# Patient Record
Sex: MALE | Race: OTHER | NOT HISPANIC OR LATINO | URBAN - METROPOLITAN AREA
[De-identification: names, ages, dates, MRNs, and addresses within clinical notes are randomized per-mention and may not be internally consistent; named-entity substitution may affect disease eponyms.]

---

## 2018-07-22 ENCOUNTER — INPATIENT (INPATIENT)
Facility: HOSPITAL | Age: 58
LOS: 1 days | Discharge: LEFT BEFORE DISCHARGE | End: 2018-07-24
Attending: HOSPITALIST | Admitting: HOSPITALIST
Payer: COMMERCIAL

## 2018-07-22 VITALS
TEMPERATURE: 100 F | DIASTOLIC BLOOD PRESSURE: 78 MMHG | HEART RATE: 89 BPM | SYSTOLIC BLOOD PRESSURE: 139 MMHG | RESPIRATION RATE: 16 BRPM

## 2018-07-22 DIAGNOSIS — A41.9 SEPSIS, UNSPECIFIED ORGANISM: ICD-10-CM

## 2018-07-22 DIAGNOSIS — Z98.890 OTHER SPECIFIED POSTPROCEDURAL STATES: Chronic | ICD-10-CM

## 2018-07-22 DIAGNOSIS — E78.5 HYPERLIPIDEMIA, UNSPECIFIED: ICD-10-CM

## 2018-07-22 DIAGNOSIS — I10 ESSENTIAL (PRIMARY) HYPERTENSION: ICD-10-CM

## 2018-07-22 DIAGNOSIS — I44.7 LEFT BUNDLE-BRANCH BLOCK, UNSPECIFIED: ICD-10-CM

## 2018-07-22 DIAGNOSIS — R07.9 CHEST PAIN, UNSPECIFIED: ICD-10-CM

## 2018-07-22 DIAGNOSIS — N39.0 URINARY TRACT INFECTION, SITE NOT SPECIFIED: ICD-10-CM

## 2018-07-22 DIAGNOSIS — Z29.9 ENCOUNTER FOR PROPHYLACTIC MEASURES, UNSPECIFIED: ICD-10-CM

## 2018-07-22 LAB
ALBUMIN SERPL ELPH-MCNC: 4.7 G/DL — SIGNIFICANT CHANGE UP (ref 3.3–5)
ALP SERPL-CCNC: 84 U/L — SIGNIFICANT CHANGE UP (ref 40–120)
ALT FLD-CCNC: 27 U/L — SIGNIFICANT CHANGE UP (ref 4–41)
APPEARANCE UR: SIGNIFICANT CHANGE UP
APTT BLD: 28.5 SEC — SIGNIFICANT CHANGE UP (ref 27.5–37.4)
AST SERPL-CCNC: 19 U/L — SIGNIFICANT CHANGE UP (ref 4–40)
BACTERIA # UR AUTO: HIGH
BASE EXCESS BLDV CALC-SCNC: 3.2 MMOL/L — SIGNIFICANT CHANGE UP
BASOPHILS # BLD AUTO: 0.02 K/UL — SIGNIFICANT CHANGE UP (ref 0–0.2)
BASOPHILS NFR BLD AUTO: 0.1 % — SIGNIFICANT CHANGE UP (ref 0–2)
BILIRUB SERPL-MCNC: 0.6 MG/DL — SIGNIFICANT CHANGE UP (ref 0.2–1.2)
BILIRUB UR-MCNC: NEGATIVE — SIGNIFICANT CHANGE UP
BLOOD GAS VENOUS - CREATININE: 0.84 MG/DL — SIGNIFICANT CHANGE UP (ref 0.5–1.3)
BLOOD UR QL VISUAL: HIGH
BUN SERPL-MCNC: 18 MG/DL — SIGNIFICANT CHANGE UP (ref 7–23)
BUN SERPL-MCNC: 19 MG/DL — SIGNIFICANT CHANGE UP (ref 7–23)
CALCIUM SERPL-MCNC: 8.4 MG/DL — SIGNIFICANT CHANGE UP (ref 8.4–10.5)
CALCIUM SERPL-MCNC: 8.8 MG/DL — SIGNIFICANT CHANGE UP (ref 8.4–10.5)
CHLORIDE BLDV-SCNC: 105 MMOL/L — SIGNIFICANT CHANGE UP (ref 96–108)
CHLORIDE SERPL-SCNC: 100 MMOL/L — SIGNIFICANT CHANGE UP (ref 98–107)
CHLORIDE SERPL-SCNC: 100 MMOL/L — SIGNIFICANT CHANGE UP (ref 98–107)
CO2 SERPL-SCNC: 22 MMOL/L — SIGNIFICANT CHANGE UP (ref 22–31)
CO2 SERPL-SCNC: 25 MMOL/L — SIGNIFICANT CHANGE UP (ref 22–31)
COLOR SPEC: YELLOW — SIGNIFICANT CHANGE UP
CREAT SERPL-MCNC: 0.84 MG/DL — SIGNIFICANT CHANGE UP (ref 0.5–1.3)
CREAT SERPL-MCNC: 0.92 MG/DL — SIGNIFICANT CHANGE UP (ref 0.5–1.3)
EOSINOPHIL # BLD AUTO: 0.06 K/UL — SIGNIFICANT CHANGE UP (ref 0–0.5)
EOSINOPHIL NFR BLD AUTO: 0.4 % — SIGNIFICANT CHANGE UP (ref 0–6)
GAS PNL BLDV: 136 MMOL/L — SIGNIFICANT CHANGE UP (ref 136–146)
GLUCOSE BLDV-MCNC: 100 — HIGH (ref 70–99)
GLUCOSE SERPL-MCNC: 102 MG/DL — HIGH (ref 70–99)
GLUCOSE SERPL-MCNC: 99 MG/DL — SIGNIFICANT CHANGE UP (ref 70–99)
GLUCOSE UR-MCNC: NEGATIVE — SIGNIFICANT CHANGE UP
HCO3 BLDV-SCNC: 26 MMOL/L — SIGNIFICANT CHANGE UP (ref 20–27)
HCT VFR BLD CALC: 41.5 % — SIGNIFICANT CHANGE UP (ref 39–50)
HCT VFR BLDV CALC: 44.3 % — SIGNIFICANT CHANGE UP (ref 39–51)
HGB BLD-MCNC: 13.5 G/DL — SIGNIFICANT CHANGE UP (ref 13–17)
HGB BLDV-MCNC: 14.4 G/DL — SIGNIFICANT CHANGE UP (ref 13–17)
IMM GRANULOCYTES # BLD AUTO: 0.05 # — SIGNIFICANT CHANGE UP
IMM GRANULOCYTES NFR BLD AUTO: 0.3 % — SIGNIFICANT CHANGE UP (ref 0–1.5)
INR BLD: 1.22 — HIGH (ref 0.88–1.17)
KETONES UR-MCNC: SIGNIFICANT CHANGE UP
LACTATE BLDV-MCNC: 1.4 MMOL/L — SIGNIFICANT CHANGE UP (ref 0.5–2)
LEUKOCYTE ESTERASE UR-ACNC: HIGH
LYMPHOCYTES # BLD AUTO: 1.2 K/UL — SIGNIFICANT CHANGE UP (ref 1–3.3)
LYMPHOCYTES # BLD AUTO: 8 % — LOW (ref 13–44)
MCHC RBC-ENTMCNC: 29.2 PG — SIGNIFICANT CHANGE UP (ref 27–34)
MCHC RBC-ENTMCNC: 32.5 % — SIGNIFICANT CHANGE UP (ref 32–36)
MCV RBC AUTO: 89.8 FL — SIGNIFICANT CHANGE UP (ref 80–100)
MONOCYTES # BLD AUTO: 1.05 K/UL — HIGH (ref 0–0.9)
MONOCYTES NFR BLD AUTO: 7 % — SIGNIFICANT CHANGE UP (ref 2–14)
MUCOUS THREADS # UR AUTO: SIGNIFICANT CHANGE UP
NEUTROPHILS # BLD AUTO: 12.58 K/UL — HIGH (ref 1.8–7.4)
NEUTROPHILS NFR BLD AUTO: 84.2 % — HIGH (ref 43–77)
NITRITE UR-MCNC: POSITIVE — HIGH
NRBC # FLD: 0 — SIGNIFICANT CHANGE UP
PCO2 BLDV: 44 MMHG — SIGNIFICANT CHANGE UP (ref 41–51)
PH BLDV: 7.41 PH — SIGNIFICANT CHANGE UP (ref 7.32–7.43)
PH UR: 6 — SIGNIFICANT CHANGE UP (ref 4.6–8)
PLATELET # BLD AUTO: 219 K/UL — SIGNIFICANT CHANGE UP (ref 150–400)
PMV BLD: 9.5 FL — SIGNIFICANT CHANGE UP (ref 7–13)
PO2 BLDV: 35 MMHG — SIGNIFICANT CHANGE UP (ref 35–40)
POTASSIUM BLDV-SCNC: 3.4 MMOL/L — SIGNIFICANT CHANGE UP (ref 3.4–4.5)
POTASSIUM SERPL-MCNC: 3.5 MMOL/L — SIGNIFICANT CHANGE UP (ref 3.5–5.3)
POTASSIUM SERPL-MCNC: 3.6 MMOL/L — SIGNIFICANT CHANGE UP (ref 3.5–5.3)
POTASSIUM SERPL-SCNC: 3.5 MMOL/L — SIGNIFICANT CHANGE UP (ref 3.5–5.3)
POTASSIUM SERPL-SCNC: 3.6 MMOL/L — SIGNIFICANT CHANGE UP (ref 3.5–5.3)
PROT SERPL-MCNC: 7.8 G/DL — SIGNIFICANT CHANGE UP (ref 6–8.3)
PROT UR-MCNC: 100 MG/DL — SIGNIFICANT CHANGE UP
PROTHROM AB SERPL-ACNC: 13.6 SEC — HIGH (ref 9.8–13.1)
RBC # BLD: 4.62 M/UL — SIGNIFICANT CHANGE UP (ref 4.2–5.8)
RBC # FLD: 12.3 % — SIGNIFICANT CHANGE UP (ref 10.3–14.5)
RBC CASTS # UR COMP ASSIST: SIGNIFICANT CHANGE UP (ref 0–?)
SAO2 % BLDV: 65.5 % — SIGNIFICANT CHANGE UP (ref 60–85)
SODIUM SERPL-SCNC: 138 MMOL/L — SIGNIFICANT CHANGE UP (ref 135–145)
SODIUM SERPL-SCNC: 139 MMOL/L — SIGNIFICANT CHANGE UP (ref 135–145)
SP GR SPEC: 1.03 — SIGNIFICANT CHANGE UP (ref 1–1.04)
SQUAMOUS # UR AUTO: SIGNIFICANT CHANGE UP
TROPONIN T, HIGH SENSITIVITY: 9 NG/L — SIGNIFICANT CHANGE UP (ref ?–14)
TROPONIN T, HIGH SENSITIVITY: 9 NG/L — SIGNIFICANT CHANGE UP (ref ?–14)
UROBILINOGEN FLD QL: NORMAL MG/DL — SIGNIFICANT CHANGE UP
WBC # BLD: 14.96 K/UL — HIGH (ref 3.8–10.5)
WBC # FLD AUTO: 14.96 K/UL — HIGH (ref 3.8–10.5)
WBC UR QL: >50 — HIGH (ref 0–?)

## 2018-07-22 PROCEDURE — 12345: CPT

## 2018-07-22 PROCEDURE — 99223 1ST HOSP IP/OBS HIGH 75: CPT | Mod: GC

## 2018-07-22 PROCEDURE — 71045 X-RAY EXAM CHEST 1 VIEW: CPT | Mod: 26

## 2018-07-22 RX ORDER — CEFTRIAXONE 500 MG/1
1 INJECTION, POWDER, FOR SOLUTION INTRAMUSCULAR; INTRAVENOUS ONCE
Qty: 0 | Refills: 0 | Status: COMPLETED | OUTPATIENT
Start: 2018-07-22 | End: 2018-07-22

## 2018-07-22 RX ORDER — ACETAMINOPHEN 500 MG
975 TABLET ORAL ONCE
Qty: 0 | Refills: 0 | Status: COMPLETED | OUTPATIENT
Start: 2018-07-22 | End: 2018-07-22

## 2018-07-22 RX ORDER — ATORVASTATIN CALCIUM 80 MG/1
80 TABLET, FILM COATED ORAL AT BEDTIME
Qty: 0 | Refills: 0 | Status: DISCONTINUED | OUTPATIENT
Start: 2018-07-22 | End: 2018-07-24

## 2018-07-22 RX ORDER — SODIUM CHLORIDE 9 MG/ML
1000 INJECTION INTRAMUSCULAR; INTRAVENOUS; SUBCUTANEOUS
Qty: 0 | Refills: 0 | Status: DISCONTINUED | OUTPATIENT
Start: 2018-07-22 | End: 2018-07-24

## 2018-07-22 RX ORDER — VALSARTAN 80 MG/1
160 TABLET ORAL DAILY
Qty: 0 | Refills: 0 | Status: DISCONTINUED | OUTPATIENT
Start: 2018-07-22 | End: 2018-07-24

## 2018-07-22 RX ORDER — HEPARIN SODIUM 5000 [USP'U]/ML
5000 INJECTION INTRAVENOUS; SUBCUTANEOUS EVERY 8 HOURS
Qty: 0 | Refills: 0 | Status: DISCONTINUED | OUTPATIENT
Start: 2018-07-22 | End: 2018-07-24

## 2018-07-22 RX ORDER — ROSUVASTATIN CALCIUM 5 MG/1
1 TABLET ORAL
Qty: 0 | Refills: 0 | COMMUNITY

## 2018-07-22 RX ORDER — ACETAMINOPHEN 500 MG
650 TABLET ORAL EVERY 6 HOURS
Qty: 0 | Refills: 0 | Status: DISCONTINUED | OUTPATIENT
Start: 2018-07-22 | End: 2018-07-24

## 2018-07-22 RX ORDER — CEFTRIAXONE 500 MG/1
1 INJECTION, POWDER, FOR SOLUTION INTRAMUSCULAR; INTRAVENOUS EVERY 24 HOURS
Qty: 0 | Refills: 0 | Status: DISCONTINUED | OUTPATIENT
Start: 2018-07-23 | End: 2018-07-24

## 2018-07-22 RX ADMIN — ATORVASTATIN CALCIUM 80 MILLIGRAM(S): 80 TABLET, FILM COATED ORAL at 21:53

## 2018-07-22 RX ADMIN — Medication 975 MILLIGRAM(S): at 17:06

## 2018-07-22 RX ADMIN — CEFTRIAXONE 100 GRAM(S): 500 INJECTION, POWDER, FOR SOLUTION INTRAMUSCULAR; INTRAVENOUS at 17:10

## 2018-07-22 RX ADMIN — HEPARIN SODIUM 5000 UNIT(S): 5000 INJECTION INTRAVENOUS; SUBCUTANEOUS at 21:53

## 2018-07-22 RX ADMIN — Medication 975 MILLIGRAM(S): at 16:36

## 2018-07-22 NOTE — H&P ADULT - NSHPREVIEWOFSYSTEMS_GEN_ALL_CORE
REVIEW OF SYSTEMS:    CONSTITUTIONAL: No weakness, fatigue, malaise, fevers or chills, no weight change, appetite change  EYES: No visual changes; No double vision,  No vertigo, eye pain  Ears: no otalgia, no otorhea, no hearing loss, tinnitus  Nose: no epistaxis, rhinorrhea, post-discharge, sinus pressure  Throat: no throat pain, no oral lesions, tooth pain   NECK: No pain or stiffness  RESPIRATORY: No cough (productive or dry), wheezing, hemoptysis; No shortness of breath, orthnopnea, PND, SANDOVAL, snoring,  CARDIOVASCULAR: No chest pain or palpitations, no leg edema, no claudication    GASTROINTESTINAL: No abdominal or epigastric pain. No nausea, vomiting, or hematemesis; No diarrhea or constipation. No melena or hematochezia.  GENITOURINARY: No dysuria, frequency, urgency or hematuria, no pelvic pain, urinary incontinence, urgency  Muscloskeletal: no joints or muscle pain, no swelling in joints or muscles  NEUROLOGICAL: No numbness or weakness, headache, memory loss, seizures, dizziness, vertigo, syncope, ataxia  SKIN: No pruritis, rashes, lesions or new moles  Psych: No anxiety, sadness, insomnia, suicide thoughts  Endocrine: No Heat or Cold intolerance, polydipsia, polyphagia  Heme/Lymph: no LN enlargement, no easy bruising or bleeding REVIEW OF SYSTEMS:    CONSTITUTIONAL: No weakness, fatigue, malaise, +fevers, +chills, no weight change, no appetite change  RESPIRATORY: No cough (productive or dry), wheezing, hemoptysis; No shortness of breath, orthopnea, PND, SANDOVAL  CARDIOVASCULAR: +L sided chest pain, neg palpitations, no leg edema, no claudication    GASTROINTESTINAL: No abdominal or epigastric pain. No nausea, vomiting, or hematemesis; No diarrhea or constipation. No melena or hematochezia.  GENITOURINARY: +dysuria, neg frequency, urgency or hematuria, no pelvic pain, urinary incontinence, urgency  Muscloskeletal: no joints or muscle pain, no swelling in joints or muscles  NEUROLOGICAL: No numbness or weakness, headache, memory loss, seizures, dizziness, vertigo, syncope, ataxia  SKIN: No pruritis, rashes, lesions or new moles  Endocrine: No Heat or Cold intolerance, polydipsia, polyphagia  Heme/Lymph: no LN enlargement, no easy bruising or bleeding

## 2018-07-22 NOTE — H&P ADULT - PROBLEM SELECTOR PLAN 3
unclear how long this LBBB is and how new it is given prior abnormal TTE and stress test  -c/w close monitoring of tele  -f/u cardio recs

## 2018-07-22 NOTE — ED ADULT NURSE NOTE - OBJECTIVE STATEMENT
59yo male PMH HTN, hyperlipidemia presenting from urgent care with chest pain and abnormal EKG. patient went to urgent care today for urinary symptoms and because he developed left-sided pressure-like pain. + UTI dx at urgent care. Patient also noted to have Left Bundle Branch Block on EKG which he has never noticed before and sent to ED for STEMI alert. Patient states that the pain is constant, pressure like squeezing, 5/10 pain. Febrile to 102.8 orally, +Chills and diaphoresis, no vomiting. EKG performed, PIV inserted, labs sent as ordered.

## 2018-07-22 NOTE — H&P ADULT - PROBLEM SELECTOR PLAN 4
has c/o dysuria but denies prostate or urinary issues in the past   -c/w Ceftriaxone as above and US abdomen  -f/u UC has c/o dysuria but denies prostate or urinary issues in the past   -c/w Ceftriaxone as above and US abdomen  -f/u PSA, possibly BPH?   -f/u UC nitrite positive infection with microscopic hematuria  has c/o dysuria but denies prostate or urinary issues in the past   -c/w Ceftriaxone as above and US abdomen  -f/u PSA, possibly BPH?   -f/u UC  -symptoms resolving, per patient

## 2018-07-22 NOTE — ED PROVIDER NOTE - OBJECTIVE STATEMENT
57yo male PMH HTN, hyperlipidemia presenting from urgent care with chest pain and abnormal EKG. patient went to urgent care today for urinary symptoms and because he developed left-sided pressure-like pain, found to have temp 100.1F orally and told he had a UTI. Patient also noted to have Left Bundle Branch Block on EKG which he has never noticed before and sent to ED for STEMI alert. Patient states that the pain is constant and non-exertional, has improved upon arrival to ED. +Chills and diaphoresis, no vomiting. 57yo male PMH HTN, hyperlipidemia presenting from urgent care with chest pain and abnormal EKG. patient went to urgent care today for urinary symptoms and because he developed left-sided pressure-like pain, found to have temp 100.1F orally and told he had a UTI. Patient also noted to have Left Bundle Branch Block on EKG which he has never noticed before and sent to ED for STEMI alert. Patient states that the pain is constant and non-exertional, has improved upon arrival to ED. +Chills and diaphoresis, no vomiting.  PT DID NOT RECEIVE ANY ASA BECAUSE HE HAS ALLERGY TO ASA (HIVES).

## 2018-07-22 NOTE — H&P ADULT - PROBLEM SELECTOR PLAN 2
possibly cardiac etiology vs UTI related w/ LBBB and unclear history of prior EKGs, considering NSTEMI/UA, Troponin 9 for now  -f/u troponins  -f/u Cardio recs  -f/u TTE   -f/u BNP possibly cardiac etiology vs UTI related w/ LBBB and unclear history of prior EKGs, considering NSTEMI/UA, Troponin 9 to 9 and unchanged   -f/u Cardio recs  -f/u TTE   -f/u BNP  -hold ASA due to ASA allergy and holding for BB and CCB for potential stress tests possibly cardiac etiology vs S/E of Sepsis/UTI related w/ LBBB and unclear history of prior EKGs, considering NSTEMI/UA, Troponin 9 to 9 - unchanged   -f/u Cardio recs  -f/u TTE   -f/u BNP  -hold ASA due to ASA allergy and holding for BB and CCB for potential stress tests possibly cardiac etiology vs S/E of Sepsis/UTI related w/ LBBB w/  and unclear history of prior EKGs, considering NSTEMI/UA, Troponin 9 to 9 - unchanged   -f/u Cardio recs  -f/u TTE   -f/u BNP  -hold ASA due to ASA allergy and holding for BB and CCB for potential stress tests possibly cardiac etiology vs S/E of Sepsis/UTI related w/ LBBB w/  and unclear history of prior EKGs, considering NSTEMI/UA, Troponin 9 to 9 - unchanged   -f/u Cardio recs  -f/u TTE   -f/u BNP, Thyroid fxn tests, Lipid profile  -hold ASA due to ASA allergy and holding for BB and CCB for potential stress tests possibly cardiac etiology vs S/E of Sepsis/UTI related w/ LBBB w/  and unclear history of prior EKGs, considering NSTEMI/UA, Troponin 9 to 9 - unchanged   -already seen by Cardiology team (appreciated)  -f/u TTE   -f/u BNP, Thyroid fxn tests, Lipid profile  -hold ASA due to ASA allergy and holding for BB and CCB for potential stress tests  -f/u with cardiology team for further recommendation/s

## 2018-07-22 NOTE — CONSULT NOTE ADULT - ASSESSMENT
58M with HTN and HLD presents with fever, UTI, and chest pain in setting of ? new LBBB. Concern for ischemia however initial troponin is negative.      D/W Anshul Paredes and Jignesh (interventionalist) would admit to medicine/telemetry. Abx for UTI, trend cardiac enzymes. check ECHO and will consider need for further ischemia evaluation with either nuclear stress or cardiac cath. Cardio will follow.

## 2018-07-22 NOTE — ED ADULT TRIAGE NOTE - CHIEF COMPLAINT QUOTE
presently have chest pain 5/1o was sent from urgent care   with an EKg still c/o of chest pain also has low grade temp and known UTI

## 2018-07-22 NOTE — H&P ADULT - NSHPSOCIALHISTORY_GEN_ALL_CORE
Never smoker, alcohol or IVDU, lives with Never smoker, alcohol or IVDU, lives with his family and is from [facility] but lives in the  Never smoker, social alcohol (1-2x per mon) and never used IV drugs, lives with his family and is from [facility] but lives in the [place] for the past 35 yrs

## 2018-07-22 NOTE — H&P ADULT - ASSESSMENT
58M w/ PMH of HTN and HLD p/w CP 58M w/ PMH of HTN and HLD p/w CP found to have LBBB on EKG and UTI 58M w/ PMH of HTN and HLD p/w sepsis most likely due to UTI and CP most likely due to cardiac etiology w/ LBBB on EKG along w/ possible infectious contribution from the ongoing sepsis

## 2018-07-22 NOTE — ED PROVIDER NOTE - NS ED ROS FT
Gen: NAD, AOx3  Head: NCAT  Lung: CTAB, no respiratory distress, no wheezing, rales, rhonchi  CV: normal s1/s2, rrr, no murmurs, Normal perfusion, pulses 2+ throughout  Abd: soft, NTND  MSK: No edema, no visible deformities, full range of motion in all 4 extremities  Neuro:  No focal neurologic deficits  Skin: No rash   Psych: normal affect

## 2018-07-22 NOTE — H&P ADULT - NSHPPHYSICALEXAM_GEN_ALL_CORE
General: NAD, well nourished, appears stated age  HEENT:  Head: NT, AT  Eyes: EOMI, scleara non-icteric, conjunctiva clear, PERRLA, visual fields full to confrontration   Ears: hearing intact b/l  Nose: no discharge, obstruction, non-deviated  Mouth: no exudates, injected in pharynx, uvula midlines   Neck: Supple, No JVD, no carotid bruits no lymphadenopathy, no thyroidomegaly  Cardiac: RRR, S1 S2, No M/R/G, PMI in 5th IC  Pulmonary: CTA b/l, Breathing unlabored, No Rhonchi/Rales/Wheezing, diaphragm moves symmetrically b/l  Abdomen: Soft, Non -tender, +BS x 4 quads, no hepatomegaly or splenomegaly  Back: straight, no step-offs, no kyphosis, no CVA  Extremities: Warm, nontender, No Rashes, lesions, bruises, No pitting edema, venous stasis  Neuro: AO x 3, No focal deficits, CNII-CNXII grossly intact, Motor: strength 5/5 b/l UE and LE. Sensory: touch and pinprick sensation intact b/l. Cerebellar: no dysmetria, no tremor  Reflexes: 2+ b/l in LE and UE, no Babinski sign b/l General: NAD, well nourished, appears stated age  HEENT:  Head: NT, AT  Eyes: EOMI, scleara non-icteric, conjunctiva clear  Neck: Supple, No JVD, no carotid bruits no lymphadenopathy, no thyroidomegaly  Cardiac: RRR, S1 S2, No M/R/G  Pulmonary: CTA b/l, Breathing unlabored, No Rhonchi/Rales/Wheezing  Abdomen: Soft, Non -tender, +BS x 4 quads  Extremities: Warm, nontender, No Rashes, lesions, bruises, No pitting edema, venous stasis  Neuro: AO x 3, No focal deficits, CNII-CNXII grossly intact, Motor: strength 5/5 b/l UE and LE. General: NAD, well nourished, appears stated age  HEENT:  Head: NT, AT  Eyes: EOMI, sclera non-icteric, conjunctiva clear  Neck: Supple, No JVD, no carotid bruits no lymphadenopathy, no thyromegaly  Cardiac: RRR, S1 S2, No M/R/G  Pulmonary: CTA b/l, Breathing unlabored, No Rhonchi/Rales/Wheezing  Abdomen: Soft, Non -tender, +BS x 4 quads  Extremities: Warm, nontender, No Rashes, lesions, bruises, No pitting edema, venous stasis  Neuro: AO x 3, No focal deficits, CNII-CNXII grossly intact, Motor: strength 5/5 b/l UE and LE.

## 2018-07-22 NOTE — H&P ADULT - NSHPLABSRESULTS_GEN_ALL_CORE
LABS:                        13.5   14.96 )-----------( 219      ( 2018 15:26 )             41.5     07-22    139  |  100  |  19  ----------------------------<  102<H>  3.6   |  25  |  0.92    Ca    8.8      2018 15:26    TPro  7.8  /  Alb  4.7  /  TBili  0.6  /  DBili  x   /  AST  19  /  ALT  27  /  AlkPhos  84  07-22    PT/INR - ( 2018 15:26 )   PT: 13.6 SEC;   INR: 1.22          PTT - ( 2018 15:26 )  PTT:28.5 SEC      Urinalysis Basic - ( 2018 17:05 )    Color: YELLOW / Appearance: HAZY / S.026 / pH: 6.0  Gluc: NEGATIVE / Ketone: TRACE  / Bili: NEGATIVE / Urobili: NORMAL mg/dL   Blood: LARGE / Protein: 100 mg/dL / Nitrite: POSITIVE   Leuk Esterase: LARGE / RBC: 10-25 / WBC >50   Sq Epi: OCC / Non Sq Epi: x / Bacteria: MANY        RADIOLOGY & ADDITIONAL TESTS:    Imaging Personally Reviewed:  Yes LABS:                        13.5   14.96 )-----------( 219      ( 2018 15:26 )             41.5     07-22    139  |  100  |  19  ----------------------------<  102<H>  3.6   |  25  |  0.92    Ca    8.8      2018 15:26    TPro  7.8  /  Alb  4.7  /  TBili  0.6  /  DBili  x   /  AST  19  /  ALT  27  /  AlkPhos  84  07-22    PT/INR - ( 2018 15:26 )   PT: 13.6 SEC;   INR: 1.22          PTT - ( 2018 15:26 )  PTT:28.5 SEC      Urinalysis Basic - ( 2018 17:05 )    Color: YELLOW / Appearance: HAZY / S.026 / pH: 6.0  Gluc: NEGATIVE / Ketone: TRACE  / Bili: NEGATIVE / Urobili: NORMAL mg/dL   Blood: LARGE / Protein: 100 mg/dL / Nitrite: POSITIVE   Leuk Esterase: LARGE / RBC: 10-25 / WBC >50   Sq Epi: OCC / Non Sq Epi: x / Bacteria: MANY    RADIOLOGY & ADDITIONAL TESTS:  CXR: no focal consolidations     Imaging Personally Reviewed:  Yes

## 2018-07-22 NOTE — ED PROVIDER NOTE - ATTENDING CONTRIBUTION TO CARE
Dr. Vicente: 57 yo male with HTN, HLD, sent to ED from urgent care with LBBB and unknown if it is old.  Pt states that he had chest pain almost every day, but it has been worse recently.  Associated with chills and diaphoresis.  Went to urgent care and had EKG showing ?new LBBB and was sent to ED.  Was also found to have UTI and fever at .  In ED pt without acute complaints, including SOB, N/V/D or abdominal pan.  Chest pain is in left chest and has been constant "pressure" since earlier today.  On exam pt well appearing, in NAD, heart RRR, lungs CTAB, abd NTND, extremities without swelling, strength 5/5 in all extremities and skin without rash.

## 2018-07-22 NOTE — H&P ADULT - PMH
HLD (hyperlipidemia)    HTN (hypertension) Abnormal echocardiogram    Aspirin allergy    HLD (hyperlipidemia)    HTN (hypertension)

## 2018-07-22 NOTE — CONSULT NOTE ADULT - SUBJECTIVE AND OBJECTIVE BOX
Date of Admission: 7/22/2018    CHIEF COMPLAINT: L sided sticking chest pain    HISTORY OF PRESENT ILLNESS: 58M with HTN and HLD presents this am to urgent care with urinary symptoms and low grade fever, also with L sided sticking chest pain.  EKG revealed LBBB but unknown if new or old. Patient diagnosed with UTI but sent to ER for further evaluation of chest pain in setting of ? new LBBB. Patient reports chest pain increases somewhat with movement to L side but otherwise does not change. He also reports previous workup for same chest pain with EST, was told it was abnormal and referred for nuclear stress but never followed up. In ER patient with minimal L sided chest discomfort, no radiation or associated symptoms. EKG as noted NSR with LBBB. + fever to 102. Cardiology is consulted for ? new LBBB.      Allergies    Aspirin Adult EC Low Dose (Hives)    Intolerances    	    MEDICATIONS:    cefTRIAXone   IVPB 1 Gram(s) IV Intermittent Once        PAST MEDICAL & SURGICAL HISTORY:  HLD (hyperlipidemia)  HTN (hypertension)      FAMILY HISTORY:      SOCIAL HISTORY:    Smoker  denies  Alcohol denies  Drugs denies      REVIEW OF SYSTEMS:  See HPI. Otherwise, 10 point ROS done and otherwise negative.    PHYSICAL EXAM:  T(C): 39.3 (07-22-18 @ 15:13), Max: 39.3 (07-22-18 @ 15:13)  HR: 88 (07-22-18 @ 15:13) (88 - 89)  BP: 157/80 (07-22-18 @ 15:13) (139/78 - 157/80)  RR: 20 (07-22-18 @ 15:13) (16 - 20)  SpO2: 99% (07-22-18 @ 15:13) (99% - 99%)  Wt(kg): --  I&O's Summary      Appearance: NAD, skin warm but dry.	  HEENT:   Normal oral mucosa, PERRL, EOMI	  Cardiovascular: nl S1S2  Respiratory: CTA B/L	  Psychiatry: A & O x 3, Mood & affect appropriate  Gastrointestinal:  Soft, Non-tender, + BS	  Skin: No rashes, No ecchymoses, No cyanosis	  Neurologic: Non-focal  Extremities: no C/C/E        LABS:	 	                        13.5   14.96 )-----------( 219      ( 22 Jul 2018 15:26 )             41.5       07-22    139  |  100  |  19  ----------------------------<  102<H>  3.6   |  25  |  0.92    Ca    8.8      22 Jul 2018 15:26    TPro  7.8  /  Alb  4.7  /  TBili  0.6  /  DBili  x   /  AST  19  /  ALT  27  /  AlkPhos  84  07-22    CARDIAC MARKERS:  Troponin T, High Sensitivity: 9 ng/L (07-22-18 @ 15:26)      TELEMETRY: 	NSR occ APC      ECG:  	NSR with LBBB Date of Admission: 7/22/2018    CHIEF COMPLAINT: L sided sticking chest pain    HISTORY OF PRESENT ILLNESS: 58M with HTN and HLD presents this am to urgent care with urinary symptoms and low grade fever, also with L sided sticking chest pain.  EKG revealed LBBB but unknown if new or old. Patient diagnosed with UTI but sent to ER for further evaluation of chest pain in setting of ? new LBBB. Patient reports chest pain increases somewhat with movement to L side but otherwise does not change. He also reports previous workup for same chest pain with EST, was told it was abnormal and referred for nuclear stress but never had the test or followed up. In ER patient with minimal L sided chest discomfort, no radiation or associated symptoms. EKG as noted NSR with LBBB. + fever to 102. Cardiology is consulted for ? new LBBB.      Allergies    Aspirin Adult EC Low Dose (Hives)    Intolerances    	    MEDICATIONS:    cefTRIAXone   IVPB 1 Gram(s) IV Intermittent Once        PAST MEDICAL & SURGICAL HISTORY:  HLD (hyperlipidemia)  HTN (hypertension)      FAMILY HISTORY:      SOCIAL HISTORY:    Smoker  denies  Alcohol denies  Drugs denies      REVIEW OF SYSTEMS:  See HPI. Otherwise, 10 point ROS done and otherwise negative.    PHYSICAL EXAM:  T(C): 39.3 (07-22-18 @ 15:13), Max: 39.3 (07-22-18 @ 15:13)  HR: 88 (07-22-18 @ 15:13) (88 - 89)  BP: 157/80 (07-22-18 @ 15:13) (139/78 - 157/80)  RR: 20 (07-22-18 @ 15:13) (16 - 20)  SpO2: 99% (07-22-18 @ 15:13) (99% - 99%)  Wt(kg): --  I&O's Summary      Appearance: NAD, skin warm but dry.	  HEENT:   Normal oral mucosa, PERRL, EOMI	  Cardiovascular: nl S1S2  Respiratory: CTA B/L	  Psychiatry: A & O x 3, Mood & affect appropriate  Gastrointestinal:  Soft, Non-tender, + BS	  Skin: No rashes, No ecchymoses, No cyanosis	  Neurologic: Non-focal  Extremities: no C/C/E        LABS:	 	                        13.5   14.96 )-----------( 219      ( 22 Jul 2018 15:26 )             41.5       07-22    139  |  100  |  19  ----------------------------<  102<H>  3.6   |  25  |  0.92    Ca    8.8      22 Jul 2018 15:26    TPro  7.8  /  Alb  4.7  /  TBili  0.6  /  DBili  x   /  AST  19  /  ALT  27  /  AlkPhos  84  07-22    CARDIAC MARKERS:  Troponin T, High Sensitivity: 9 ng/L (07-22-18 @ 15:26)      TELEMETRY: 	NSR occ APC      ECG:  	NSR with LBBB

## 2018-07-22 NOTE — H&P ADULT - PROBLEM SELECTOR PLAN 6
home meds of rouvastatin 20mg qd   c/w therapeutic interchange w/ Lipitor 80mg qd home meds of rosuvastatin 20mg qd   c/w therapeutic interchange w/ Lipitor 80mg qd

## 2018-07-22 NOTE — H&P ADULT - PROBLEM SELECTOR PLAN 1
possibly cardiac etiology vs UTI related w/ LBBB and unclear history of prior EKGs  -f/u troponin   -f/u Cardio recs  -f/u TTE   -f/u BNP -Sepsis most likely due to UTI, pt has dysuria but denies any prior prostate issues  -c/w Ceftriaxone 1g for total of 5 days  -f/u BCx2 and UCx1 for now  -f/u US abdomen to r/o renal/urinary tract abnormalities/BPH for causes of UTI -Sepsis most likely due to UTI, pt has dysuria but denies any prior prostate issues  -c/w Ceftriaxone 1g for total of 5 days  -f/u BCx2 and UCx1 for now  -f/u US abdomen to r/o renal/urinary tract abnormalities/BPH for causes of UTI  -short course IVF hydration

## 2018-07-22 NOTE — CONSULT NOTE ADULT - ATTENDING COMMENTS
TTE with moderate LV dysfunction. Patient with L sided chest discomfort at rest that has been ongoing for a few months. Patient states he had an echo done a few months ago, unclear findings. TTE with moderate LV dysfunction. Patient with L sided chest discomfort at rest that has been ongoing for a few months. Patient states he had an echo done a few months ago, unclear findings. Would change valsartan to losartan 25 mg daily, can start coreg 3.125 BID, no evidence of overt HF on exam.   Will discuss timing of (possible cath for HFrEF workup) with Interventionalist (given active UTI). TTE with moderate LV dysfunction. Patient with L sided chest discomfort at rest that has been ongoing for a few months. Patient states he had an echo done a few months ago, unclear findings. Would change valsartan to losartan 25 mg daily, can start coreg 3.125 BID, no evidence of overt HF on exam.   Discussed with Interventionalist, plan for cath  when off IV abx.

## 2018-07-22 NOTE — ED PROVIDER NOTE - MEDICAL DECISION MAKING DETAILS
59yo male PMH HTN, hyperlipidemia presenting with left-sided chest pressure and abnormal EKG in setting of UTI and fever, ddx includes sepsis with demand ischemia vs myocardial infarction, will check labs, cardiology consulted and aware, patient with allergy to ASA, continuing to monitor. Kay Rowland DO

## 2018-07-22 NOTE — ED PROVIDER NOTE - PROGRESS NOTE DETAILS
Dr. Vicente: Dr. Cronin on phone discussing case with cardiology after faxing EKG Mehrdad: ECG brought to me for eval from triage revealing LBBB.  Pt reports left sided pressure-pain since 11 am this morning a/w chills and diaphoresis.  Went to an  where ECG was done.  he was told he has UTI and sent to ED d/t LBBB on ECG.  Still with CP.  States he has had intermittent pain for months and had stress 6-7 months ago, and echo.  Was told "something was abnormal" at stress, but that Echo was normal. Has never had cath, nonsmoker.  Also, says he has never been told he has an abnormal ECG.  Appears uncomfortable, VSS.  I called STEMI alert and d/w cards fellow who answered for Dr. Egan who was in procedure and was heard in background d/w her.  He will call Dr. Vicente . I provided verbal handoff to Dr. Vicente. Pt in Trauma C. Mehrdad: ECG brought to me for eval from triage revealing LBBB.  Pt reports left sided pressure-pain since 11 am this morning a/w chills and diaphoresis.  Went to an  where ECG was done.  he was told he has UTI and sent to ED d/t LBBB on ECG.  Still with CP.  States he has had intermittent pain for months and had stress 6-7 months ago, and echo.  Was told "something was abnormal" at stress, but that Echo was normal. Has never had cath, nonsmoker.  Also, says he has never been told he has an abnormal ECG.  Appears uncomfortable, VSS.  I called STEMI alert, emailed our email and US email to [email or web address] and d/w cards fellow who answered for Dr. Egan who was in procedure and was heard in background d/w her.  He will call Dr. Vicente . I provided verbal handoff to Dr. Vicente. Pt in Trauma C. Dr. Vicente: Dr. Cronin discussed case with cardiology attending Dr. Deedee Egan, will await trop and re-contact cardiology Dr. Vicente: Dr. Rowland spoke to cardiology NP, discussed with cardiology team, feel that in setting of fever it is less likely ACS, would like to trend cardiac enzymes and get echo tomorrow, will follow during admission

## 2018-07-22 NOTE — ED ADULT NURSE REASSESSMENT NOTE - NS ED NURSE REASSESS COMMENT FT1
report received at shift change from RN Lilia, pt remains AAOx3, VS as noted, pt in NAD, awaiting bed assignment, family at bedside, will continue to monitor pt.

## 2018-07-22 NOTE — H&P ADULT - PROBLEM SELECTOR PLAN 5
home meds of amlodipine 10mg qhs, vaslaratan 160 mg qd, nebivolol 5mg qd home meds of amlodipine 10mg qhs, valsartan 160 mg qd, nebivolol 5mg qd  -hold nebivolol 5mg qd and amlodipine 10mg qhs for now for potential stress test and unknown TTE   -restart valsartan 160mg qd home meds of amlodipine 10mg qhs, valsartan 160 mg qd, nebivolol 5mg qd  -hold nebivolol 5mg qd (for potential stress test and unknown TTE result)  -hold amlodipine 10mg qhs for now, in the setting of infection (re-start if BP warrants)  -restart valsartan 160mg qd

## 2018-07-22 NOTE — H&P ADULT - HISTORY OF PRESENT ILLNESS
PE    In the ED VS: Tmax 102.8, 139-157/78-80, HR 88-89, RR 16-20, 99% on RA  Labs: Troponin 9, UA Large LE, positive Nitrite, blood large, >50 WBC, many bacteria, WBC 15, 84.2%, PT 13.6, INR 1.22 , lacate 1.4,   Imaging: CXR no focal consolidation   Given: Ceftriaxone and Tylenol 58M w/ PMH of HTN and HLD p/w CP. He went to an urgent care center w/ dysuria, fever and L sided CP and sent to the ED for an evaluation. His CP increased w/ movement to L side and had prior w/u for CP w/ exercise stress test that was abnormal and then was supposed to get a nuclear stress test but didn't. Evaluated by cardiology in the ED and for now would be admitted to tele and would monitor for now trending enzymes w/ no acute interventions yet.     In the ED VS: Tmax 102.8, 139-157/78-80, HR 88-89, RR 16-20, 99% on RA  Labs: Troponin 9, UA Large LE, positive Nitrite, blood large, >50 WBC, many bacteria, WBC 15, 84.2%, PT 13.6, INR 1.22 , lacate 1.4,   Imaging: CXR no focal consolidation   EKG showed LBBB (unclear if old or new)  Given: Ceftriaxone and Tylenol 58M w/ PMH of HTN and HLD p/w CP. He went to an urgent care center w/ dysuria, fever, chills, and L sided CP and sent to the ED for an evaluation. His CP increased w/ movement to L side, but non-extertional and had prior w/u for CP w/ exercise stress test that was abnormal and then was supposed to get a nuclear stress test but didn't. He was found to have a LBBB but unclear whether it is old or new. Evaluated by cardiology in the ED and for now would be admitted to tele and would monitor for now trending enzymes w/ no acute interventions yet given unclear if LBBB is old or new and no prior EKG on record.      In the ED VS: Tmax 102.8, 139-157/78-80, HR 88-89, RR 16-20, 99% on RA  Labs: Troponin 9, UA Large LE, positive Nitrite, blood large, >50 WBC, many bacteria, WBC 15, 84.2%, PT 13.6, INR 1.22 , lacate 1.4,   Imaging: CXR no focal consolidation   EKG showed LBBB (unclear if old or new)  Given: Ceftriaxone and Tylenol 58M w/ PMH of HTN and HLD p/w CP. He went to an urgent care center w/ dysuria, fever, chills, and L sided CP and sent to the ED for an evaluation. His CP increased w/ movement to L side, but non-extertional and had prior w/u 6-7 mons ago for CP w/ TTE that was normal and exercise stress test that was abnormal and then was supposed to get a nuclear stress test but didn't. He was found to have a LBBB but unclear whether it is old or new since pt denied knowing about specifically a LBBB. He has never had a cath and is a never smoker. Evaluated by cardiology in the ED and for now would be admitted to tele and would monitor for now trending enzymes w/ no acute interventions yet given unclear if LBBB is old or new and no prior EKG on record.     never had cath, nonsmoker.  Also, says he has never been told he has an abnormal ECG     In the ED VS: Tmax 102.8, 139-157/78-80, HR 88-89, RR 16-20, 99% on RA  Labs: Troponin 9, UA Large LE, positive Nitrite, blood large, >50 WBC, many bacteria, WBC 15, 84.2%, PT 13.6, INR 1.22 , lacate 1.4,   Imaging: CXR no focal consolidation   EKG showed LBBB (unclear if old or new) NSR 84, ,   Given: Ceftriaxone and Tylenol 58M w/ PMH of HTN and HLD presents from an urgent care center w/ CP, fevers and dysuria. His CP  started last night while he was watching tv and is on the left side, non radiating, 7/10, w/ no alleviation from anything and worse when he turns to the L side but no pain when touched/palpated it and it spontaneously resolved after 1-2 hours w/o any medications. However, the next day he then developed a fever and chills along w/ dysuria, HTN (reported sBP in the 180s) and CP again and went to an urgent care center where they found a LBBB on an EKG. He has had prior w/u in [facility] w/ Cardiologist in April for CP w/ first a TTE that was abnormal (unclear what the findings were) which then prompted an exercise stress test that was also abnormal and was supposed to get a nuclear stress test but didn't. He denied knowing anything about an abnormal EKG before or what a LBBB was and unclear whether it is old or new. He has never had a cath and is a never smoker. Evaluated by cardiology in the ED and for now would be admitted to tele and would monitor for now trending enzymes w/ no acute interventions yet given unclear if LBBB is old or new and no prior EKG on record. He was also found in the ED to have an UTI and denied any prior UTIs in the past, no prostate issues, urinary straining or hesitancy and no recent sick contacts, recent travels in the last 1-2 mons, leg swellings/edema, SANDOVAL, PND, nausea, vomiting, diarrhea. He takes medication from [facility] (Amlodipine 10mg qhs, Rosuvastatin 20mg qd and Nebivolol 5mg qd) and has an allergy to ASA w/ hives.    In the ED VS: Tmax 102.8, 139-157/78-80, HR 88-89, RR 16-20, 99% on RA  Labs: Troponin 9, UA Large LE, positive Nitrite, blood large, >50 WBC, many bacteria, WBC 15, 84.2%, PT 13.6, INR 1.22 , lacate 1.4, vBG 7.41/44/35/26  Imaging: CXR no focal consolidation   EKG showed LBBB (unclear if old or new) NSR 84, ,   Given: Ceftriaxone and Tylenol 58M w/ PMH of HTN and HLD presents from an urgent care center w/ CP, fevers and dysuria. His CP  started last night while he was watching tv and is on the left side, non radiating, 7/10, w/ no alleviation from anything and worse when he turns to the L side but no pain when touched/palpated it and it spontaneously resolved after 1-2 hours w/o any medications. However, the next day he then developed a fever and chills along w/ dysuria, HTN (reported sBP in the 180s) and CP again and went to an urgent care center where they found a LBBB on an EKG. He has had prior w/u in [facility] w/ Cardiologist in April for CP w/ first a TTE that was abnormal (unclear what the findings were) which then prompted an exercise stress test that was also abnormal and was supposed to get a nuclear stress test but didn't. He denied knowing anything about an abnormal EKG before or what a LBBB was and unclear whether it is old or new. He has never had a cath and is a never smoker. Evaluated by cardiology in the ED and for now would be admitted to tele and would monitor for now trending enzymes w/ no acute interventions yet given unclear if LBBB is old or new and no prior EKG on record. He was also found in the ED to have an UTI and denied any prior UTIs in the past, no prostate issues, urinary straining or hesitancy and no recent sick contacts, recent travels in the last 1-2 mons, leg swellings/edema, SANDOVAL, PND, nausea, vomiting, diarrhea. He takes medication from [facility] (Amlodipine 10mg qhs, Rosuvastatin 20mg qd and Nebivolol 5mg qd) and has an allergy to ASA w/ hives.    In the ED VS: Tmax 102.8, 139-157/78-80, HR 88-89, RR 16-20, 99% on RA  Labs: Troponin 9, UA Large LE, positive Nitrite, blood large, >50 WBC, many bacteria, WBC 15, 84.2%, PT 13.6, INR 1.22 , lacate 1.4, vBG 7.41/44/35/26  Imaging: CXR no focal consolidation   EKG showed LBBB (unclear if old or new) NSR 84,    Given: Ceftriaxone and Tylenol

## 2018-07-23 LAB
24R-OH-CALCIDIOL SERPL-MCNC: 19.8 NG/ML — LOW (ref 30–80)
ALBUMIN SERPL ELPH-MCNC: 3.9 G/DL — SIGNIFICANT CHANGE UP (ref 3.3–5)
ALP SERPL-CCNC: 68 U/L — SIGNIFICANT CHANGE UP (ref 40–120)
ALT FLD-CCNC: 19 U/L — SIGNIFICANT CHANGE UP (ref 4–41)
AST SERPL-CCNC: 16 U/L — SIGNIFICANT CHANGE UP (ref 4–40)
BASOPHILS # BLD AUTO: 0.02 K/UL — SIGNIFICANT CHANGE UP (ref 0–0.2)
BASOPHILS NFR BLD AUTO: 0.2 % — SIGNIFICANT CHANGE UP (ref 0–2)
BILIRUB SERPL-MCNC: 0.7 MG/DL — SIGNIFICANT CHANGE UP (ref 0.2–1.2)
BUN SERPL-MCNC: 19 MG/DL — SIGNIFICANT CHANGE UP (ref 7–23)
CALCIUM SERPL-MCNC: 8.2 MG/DL — LOW (ref 8.4–10.5)
CHLORIDE SERPL-SCNC: 99 MMOL/L — SIGNIFICANT CHANGE UP (ref 98–107)
CHOLEST SERPL-MCNC: 108 MG/DL — LOW (ref 120–199)
CO2 SERPL-SCNC: 25 MMOL/L — SIGNIFICANT CHANGE UP (ref 22–31)
CREAT SERPL-MCNC: 0.93 MG/DL — SIGNIFICANT CHANGE UP (ref 0.5–1.3)
EOSINOPHIL # BLD AUTO: 0.17 K/UL — SIGNIFICANT CHANGE UP (ref 0–0.5)
EOSINOPHIL NFR BLD AUTO: 1.5 % — SIGNIFICANT CHANGE UP (ref 0–6)
GLUCOSE SERPL-MCNC: 85 MG/DL — SIGNIFICANT CHANGE UP (ref 70–99)
HCT VFR BLD CALC: 37.6 % — LOW (ref 39–50)
HDLC SERPL-MCNC: 46 MG/DL — SIGNIFICANT CHANGE UP (ref 35–55)
HGB BLD-MCNC: 12.1 G/DL — LOW (ref 13–17)
IMM GRANULOCYTES # BLD AUTO: 0.04 # — SIGNIFICANT CHANGE UP
IMM GRANULOCYTES NFR BLD AUTO: 0.4 % — SIGNIFICANT CHANGE UP (ref 0–1.5)
LIPID PNL WITH DIRECT LDL SERPL: 44 MG/DL — SIGNIFICANT CHANGE UP
LYMPHOCYTES # BLD AUTO: 1.82 K/UL — SIGNIFICANT CHANGE UP (ref 1–3.3)
LYMPHOCYTES # BLD AUTO: 16.5 % — SIGNIFICANT CHANGE UP (ref 13–44)
MAGNESIUM SERPL-MCNC: 2.3 MG/DL — SIGNIFICANT CHANGE UP (ref 1.6–2.6)
MCHC RBC-ENTMCNC: 29.8 PG — SIGNIFICANT CHANGE UP (ref 27–34)
MCHC RBC-ENTMCNC: 32.2 % — SIGNIFICANT CHANGE UP (ref 32–36)
MCV RBC AUTO: 92.6 FL — SIGNIFICANT CHANGE UP (ref 80–100)
MONOCYTES # BLD AUTO: 1.02 K/UL — HIGH (ref 0–0.9)
MONOCYTES NFR BLD AUTO: 9.3 % — SIGNIFICANT CHANGE UP (ref 2–14)
NEUTROPHILS # BLD AUTO: 7.93 K/UL — HIGH (ref 1.8–7.4)
NEUTROPHILS NFR BLD AUTO: 72.1 % — SIGNIFICANT CHANGE UP (ref 43–77)
NRBC # FLD: 0 — SIGNIFICANT CHANGE UP
NT-PROBNP SERPL-SCNC: 335.7 PG/ML — SIGNIFICANT CHANGE UP
PHOSPHATE SERPL-MCNC: 2.9 MG/DL — SIGNIFICANT CHANGE UP (ref 2.5–4.5)
PLATELET # BLD AUTO: 195 K/UL — SIGNIFICANT CHANGE UP (ref 150–400)
PMV BLD: 9.9 FL — SIGNIFICANT CHANGE UP (ref 7–13)
POTASSIUM SERPL-MCNC: 3.6 MMOL/L — SIGNIFICANT CHANGE UP (ref 3.5–5.3)
POTASSIUM SERPL-SCNC: 3.6 MMOL/L — SIGNIFICANT CHANGE UP (ref 3.5–5.3)
PROT SERPL-MCNC: 7 G/DL — SIGNIFICANT CHANGE UP (ref 6–8.3)
PSA FLD-MCNC: 3.19 NG/ML — SIGNIFICANT CHANGE UP (ref 0–4)
RBC # BLD: 4.06 M/UL — LOW (ref 4.2–5.8)
RBC # FLD: 12.2 % — SIGNIFICANT CHANGE UP (ref 10.3–14.5)
SODIUM SERPL-SCNC: 139 MMOL/L — SIGNIFICANT CHANGE UP (ref 135–145)
SPECIMEN SOURCE: SIGNIFICANT CHANGE UP
SPECIMEN SOURCE: SIGNIFICANT CHANGE UP
TRIGL SERPL-MCNC: 114 MG/DL — SIGNIFICANT CHANGE UP (ref 10–149)
TSH SERPL-MCNC: 1.12 UIU/ML — SIGNIFICANT CHANGE UP (ref 0.27–4.2)
WBC # BLD: 11 K/UL — HIGH (ref 3.8–10.5)
WBC # FLD AUTO: 11 K/UL — HIGH (ref 3.8–10.5)

## 2018-07-23 PROCEDURE — 99232 SBSQ HOSP IP/OBS MODERATE 35: CPT | Mod: GC

## 2018-07-23 PROCEDURE — 93306 TTE W/DOPPLER COMPLETE: CPT | Mod: 26

## 2018-07-23 PROCEDURE — 99233 SBSQ HOSP IP/OBS HIGH 50: CPT

## 2018-07-23 RX ORDER — SODIUM CHLORIDE 9 MG/ML
500 INJECTION, SOLUTION INTRAVENOUS
Qty: 0 | Refills: 0 | Status: DISCONTINUED | OUTPATIENT
Start: 2018-07-23 | End: 2018-07-24

## 2018-07-23 RX ADMIN — SODIUM CHLORIDE 100 MILLILITER(S): 9 INJECTION, SOLUTION INTRAVENOUS at 10:17

## 2018-07-23 RX ADMIN — ATORVASTATIN CALCIUM 80 MILLIGRAM(S): 80 TABLET, FILM COATED ORAL at 21:56

## 2018-07-23 RX ADMIN — HEPARIN SODIUM 5000 UNIT(S): 5000 INJECTION INTRAVENOUS; SUBCUTANEOUS at 05:25

## 2018-07-23 RX ADMIN — CEFTRIAXONE 100 GRAM(S): 500 INJECTION, POWDER, FOR SOLUTION INTRAMUSCULAR; INTRAVENOUS at 15:06

## 2018-07-23 RX ADMIN — Medication 650 MILLIGRAM(S): at 00:45

## 2018-07-23 RX ADMIN — VALSARTAN 160 MILLIGRAM(S): 80 TABLET ORAL at 05:25

## 2018-07-23 RX ADMIN — SODIUM CHLORIDE 75 MILLILITER(S): 9 INJECTION INTRAMUSCULAR; INTRAVENOUS; SUBCUTANEOUS at 00:38

## 2018-07-24 VITALS — WEIGHT: 187.61 LBS

## 2018-07-24 LAB
BUN SERPL-MCNC: 15 MG/DL — SIGNIFICANT CHANGE UP (ref 7–23)
CALCIUM SERPL-MCNC: 8.5 MG/DL — SIGNIFICANT CHANGE UP (ref 8.4–10.5)
CHLORIDE SERPL-SCNC: 101 MMOL/L — SIGNIFICANT CHANGE UP (ref 98–107)
CO2 SERPL-SCNC: 28 MMOL/L — SIGNIFICANT CHANGE UP (ref 22–31)
CREAT SERPL-MCNC: 0.79 MG/DL — SIGNIFICANT CHANGE UP (ref 0.5–1.3)
GLUCOSE SERPL-MCNC: 90 MG/DL — SIGNIFICANT CHANGE UP (ref 70–99)
HCT VFR BLD CALC: 39.1 % — SIGNIFICANT CHANGE UP (ref 39–50)
HGB BLD-MCNC: 12.6 G/DL — LOW (ref 13–17)
MCHC RBC-ENTMCNC: 29.2 PG — SIGNIFICANT CHANGE UP (ref 27–34)
MCHC RBC-ENTMCNC: 32.2 % — SIGNIFICANT CHANGE UP (ref 32–36)
MCV RBC AUTO: 90.5 FL — SIGNIFICANT CHANGE UP (ref 80–100)
NRBC # FLD: 0 — SIGNIFICANT CHANGE UP
PLATELET # BLD AUTO: 160 K/UL — SIGNIFICANT CHANGE UP (ref 150–400)
PMV BLD: 11.2 FL — SIGNIFICANT CHANGE UP (ref 7–13)
POTASSIUM SERPL-MCNC: 3.7 MMOL/L — SIGNIFICANT CHANGE UP (ref 3.5–5.3)
POTASSIUM SERPL-SCNC: 3.7 MMOL/L — SIGNIFICANT CHANGE UP (ref 3.5–5.3)
RBC # BLD: 4.32 M/UL — SIGNIFICANT CHANGE UP (ref 4.2–5.8)
RBC # FLD: 12.1 % — SIGNIFICANT CHANGE UP (ref 10.3–14.5)
SODIUM SERPL-SCNC: 141 MMOL/L — SIGNIFICANT CHANGE UP (ref 135–145)
SPECIMEN SOURCE: SIGNIFICANT CHANGE UP
WBC # BLD: 8.01 K/UL — SIGNIFICANT CHANGE UP (ref 3.8–10.5)
WBC # FLD AUTO: 8.01 K/UL — SIGNIFICANT CHANGE UP (ref 3.8–10.5)

## 2018-07-24 PROCEDURE — 99239 HOSP IP/OBS DSCHRG MGMT >30: CPT

## 2018-07-24 PROCEDURE — 99232 SBSQ HOSP IP/OBS MODERATE 35: CPT | Mod: GC

## 2018-07-24 RX ORDER — LOSARTAN POTASSIUM 100 MG/1
25 TABLET, FILM COATED ORAL DAILY
Qty: 0 | Refills: 0 | Status: DISCONTINUED | OUTPATIENT
Start: 2018-07-24 | End: 2018-07-24

## 2018-07-24 RX ORDER — ASPIRIN/CALCIUM CARB/MAGNESIUM 324 MG
81 TABLET ORAL DAILY
Qty: 0 | Refills: 0 | Status: DISCONTINUED | OUTPATIENT
Start: 2018-07-24 | End: 2018-07-24

## 2018-07-24 RX ORDER — AMLODIPINE BESYLATE 2.5 MG/1
1 TABLET ORAL
Qty: 0 | Refills: 0 | COMMUNITY

## 2018-07-24 RX ORDER — CARVEDILOL PHOSPHATE 80 MG/1
1 CAPSULE, EXTENDED RELEASE ORAL
Qty: 60 | Refills: 0 | OUTPATIENT
Start: 2018-07-24 | End: 2018-08-22

## 2018-07-24 RX ORDER — LOSARTAN POTASSIUM 100 MG/1
1 TABLET, FILM COATED ORAL
Qty: 30 | Refills: 0 | OUTPATIENT
Start: 2018-07-24 | End: 2018-08-22

## 2018-07-24 RX ORDER — NEBIVOLOL HYDROCHLORIDE 5 MG/1
1 TABLET ORAL
Qty: 0 | Refills: 0 | COMMUNITY

## 2018-07-24 RX ORDER — VALSARTAN 80 MG/1
1 TABLET ORAL
Qty: 0 | Refills: 0 | COMMUNITY

## 2018-07-24 RX ORDER — CARVEDILOL PHOSPHATE 80 MG/1
3.12 CAPSULE, EXTENDED RELEASE ORAL EVERY 12 HOURS
Qty: 0 | Refills: 0 | Status: DISCONTINUED | OUTPATIENT
Start: 2018-07-24 | End: 2018-07-24

## 2018-07-24 RX ORDER — CEFTRIAXONE 500 MG/1
1 INJECTION, POWDER, FOR SOLUTION INTRAMUSCULAR; INTRAVENOUS ONCE
Qty: 0 | Refills: 0 | Status: COMPLETED | OUTPATIENT
Start: 2018-07-24 | End: 2018-07-24

## 2018-07-24 RX ORDER — ASPIRIN/CALCIUM CARB/MAGNESIUM 324 MG
1 TABLET ORAL
Qty: 0 | Refills: 0 | COMMUNITY
Start: 2018-07-24

## 2018-07-24 RX ADMIN — VALSARTAN 160 MILLIGRAM(S): 80 TABLET ORAL at 08:01

## 2018-07-24 RX ADMIN — CEFTRIAXONE 100 GRAM(S): 500 INJECTION, POWDER, FOR SOLUTION INTRAMUSCULAR; INTRAVENOUS at 12:54

## 2018-07-24 NOTE — PROGRESS NOTE ADULT - ATTENDING COMMENTS
Patient not amenable to being hospitalized further  Moderate LV dysfunction seen on echo, some of symptoms that he has been having occur when lying flat  To some extent he likely has symptomatic HF, would have liked to have inpt cath  He was made aware of the abovementioned.   Betablocker and ACEI
Time planning discharge 35 minutes.
D/C AMA if insisting. Pt has medical capacity to leave, he understands risk of death if he leaves. He restated the risk back to me

## 2018-07-24 NOTE — DISCHARGE NOTE ADULT - CARE PLAN
Principal Discharge DX:	UTI (urinary tract infection)  Goal:	Prevent complications  Assessment and plan of treatment:	You completed your course of antibiotics in the hospital.  Secondary Diagnosis:	Chest pain  Assessment and plan of treatment:	Recommend cardiac catheterization inpatient, however you are refusing at this time, please follow up with your primary care doctor/ cardiology  Secondary Diagnosis:	LBBB (left bundle branch block)  Assessment and plan of treatment:	Recommend cardiac catheterization inpatient, however you are refusing at this time, please follow up with your primary care doctor/ cardiology  Secondary Diagnosis:	Systolic dysfunction, left ventricle  Assessment and plan of treatment:	Recommend cardiac catheterization inpatient, however you are refusing at this time, please follow up with your primary care doctor/ cardiology Principal Discharge DX:	UTI (urinary tract infection)  Goal:	Prevent complications  Assessment and plan of treatment:	You completed your course of antibiotics in the hospital.  Secondary Diagnosis:	Chest pain  Assessment and plan of treatment:	Recommend cardiac catheterization inpatient, however you are refusing at this time, please follow up with your primary care doctor/ cardiology  Secondary Diagnosis:	LBBB (left bundle branch block)  Assessment and plan of treatment:	Recommend cardiac catheterization inpatient, however you are refusing at this time, please follow up with your primary care doctor/ cardiology  Secondary Diagnosis:	Systolic dysfunction, left ventricle  Assessment and plan of treatment:	Continue aspirin, losartan and coreg. Recommend cardiac catheterization inpatient, however you are refusing at this time, please follow up with your primary care doctor/ cardiology

## 2018-07-24 NOTE — PROGRESS NOTE ADULT - PROBLEM SELECTOR PLAN 1
-Sepsis most likely due to UTI, pt had dysuria on admission  -c/w empiric Ceftriaxone and Ucx, will give Cetriaxone x 3 days
-Sepsis most likely due to UTI, pt had dysuria on admission  -c/w empiric Ceftriaxone and f/u UCx

## 2018-07-24 NOTE — PROGRESS NOTE ADULT - PROBLEM SELECTOR PLAN 2
Pt with no pain currently. Discussed with Cardiology, TTE shows hypokinesis, plan for CATH. Pt refusing at this time, discussed with outpt PMD Dr. Pimentel. Pt insisting on leaving, will discharge AMA.
Pt with no pain currently. Discussed with Cardiology, plan for TTE to evaluate for WMAs.

## 2018-07-24 NOTE — DISCHARGE NOTE ADULT - MEDICATION SUMMARY - MEDICATIONS TO TAKE
I will START or STAY ON the medications listed below when I get home from the hospital:    aspirin 81 mg oral delayed release tablet  -- 1 tab(s) by mouth once a day  -- Indication: For Cardioprotective    losartan 25 mg oral tablet  -- 1 tab(s) by mouth once a day  -- Indication: For Systolic dysfunction, left ventricle    rosuvastatin 20 mg oral tablet  -- 1 tab(s) by mouth once a day (at bedtime)  -- Indication: For HLD (hyperlipidemia)    carvedilol 3.125 mg oral tablet  -- 1 tab(s) by mouth every 12 hours  -- Indication: For Systolic dysfunction, left ventricle

## 2018-07-24 NOTE — PROGRESS NOTE ADULT - PROBLEM SELECTOR PLAN 5
Home meds of amlodipine 10mg qhs, valsartan 160 mg qd, nebivolol 5mg qd  -hold nebivolol 5mg qd (for potential stress test and unknown TTE result)  -hold amlodipine 10mg qhs for now, in the setting of infection (re-start if BP warrants)  -C/w valsartan 160mg qd
Home meds of amlodipine 10mg qhs, valsartan 160 mg qd, nebivolol 5mg qd  -restart nevbilol if getting discharged  -restart amlodipine  -C/w valsartan 160mg qd

## 2018-07-24 NOTE — DISCHARGE NOTE ADULT - PATIENT PORTAL LINK FT
You can access the "[facility] [facility] Patient Portal, offered by [facility], by registering with the following website: [email or web address]

## 2018-07-24 NOTE — DISCHARGE NOTE ADULT - HOSPITAL COURSE
58M w/ PMH of HTN and HLD presents from an urgent care center w/ CP, fevers and dysuria. His CP  started last night while he was watching tv and is on the left side, non radiating, 7/10, w/ no alleviation from anything and worse when he turns to the L side but no pain when touched/palpated it and it spontaneously resolved after 1-2 hours w/o any medications. However, the next day he then developed a fever and chills along w/ dysuria, HTN (reported sBP in the 180s) and CP again and went to an urgent care center where they found a LBBB on an EKG. He has had prior w/u in [facility] w/ Cardiologist in April for CP w/ first a TTE that was abnormal (unclear what the findings were) which then prompted an exercise stress test that was also abnormal and was supposed to get a nuclear stress test but didn't. He denied knowing anything about an abnormal EKG before or what a LBBB was and unclear whether it is old or new. He has never had a cath and is a never smoker. Evaluated by cardiology in the ED and for now would be admitted to tele and would monitor for now trending enzymes w/ no acute interventions yet given unclear if LBBB is old or new and no prior EKG on record. He was also found in the ED to have an UTI and denied any prior UTIs in the past, no prostate issues, urinary straining or hesitancy and no recent sick contacts, recent travels in the last 1-2 mons, leg swellings/edema, SANDOVAL, PND, nausea, vomiting, diarrhea. He takes medication from [facility] (Amlodipine 10mg qhs, Rosuvastatin 20mg qd and Nebivolol 5mg qd) and has an allergy to ASA w/ hives.    Test Results:  EKG: Sinus Rhythm w/ New LBBB @ 84 bpm  WBC: 14.96-->11  Trops: 9-->9  UA: Large leuks, +Nitrates  CXR:neg    Exercise Echo Stress Test on 12/08/2017:  inconclusive study. Further testing is indicated for a pharmological nuclear stress test given to the patient.  (faxed stress report and EKG placed in chart)  7/23 ECHO:  EF- 41%, 1. Mitral annular calcification, otherwise normal mitral valve. Mild-moderate mitral regurgitation. 2. Normal left ventricular internal dimensions and wall  thicknesses. 3. Moderate global left ventricular systolic dysfunction. Paradoxical septal wall motion, consistent with an intraventricular conduction delay.  4. Normal right ventricular size and function.  *** No previous Echo exam.      + Sepsis - most likely due to UTI, pt had dysuria on admission  -s/p empiric Ceftriaxone, Ucx +Ecoli    + Chest pain. Pt with no pain currently.  Cards: TTE with moderate LV dysfunction. Patient with L sided chest discomfort at rest that has been ongoing for a few months. Patient states he had an echo done a few months ago, unclear findings.+ LBBB. Would change valsartan to losartan 25 mg daily, can start coreg 3.125 BID, no evidence of overt HF on exam.   Discussed with Interventionalist, plan for cath  when off IV abx.       + HLD (hyperlipidemia) home meds of rosuvastatin 20mg qd   c/w therapeutic interchange w/ Lipitor 80mg qd.    Pt refusing to stay inpatient for recommended cardiac Cath, and insists on following up with his primary care physician as outpatient for further management.  Patient is A&O x3 and has full capacity to make his or her own medical decisions. Pt was informed of their medical conditions, benefits, and alternatives to treatment as well as the risks of refusing treatment and the seriousness of leaving against medical advice such as the risks of heart attack and even death were fully explained to the patient.  After expressing full understanding, patient then signs out against medical advice witnessed by the nurse.  Attending made aware.

## 2018-07-24 NOTE — PROGRESS NOTE ADULT - SUBJECTIVE AND OBJECTIVE BOX
Overnight Events:   Denies CP or SOB, patient wants to leave and go home. Does not want to wait for further cardiac testing.    Review of Systems:  All other review of systems is negative unless indicated above.            Medications:  acetaminophen   Tablet 650 milliGRAM(s) Oral every 6 hours PRN  acetaminophen   Tablet. 650 milliGRAM(s) Oral every 6 hours PRN  atorvastatin 80 milliGRAM(s) Oral at bedtime  carvedilol 3.125 milliGRAM(s) Oral every 12 hours  heparin  Injectable 5000 Unit(s) SubCutaneous every 8 hours  lactated ringers. 500 milliLiter(s) IV Continuous <Continuous>  losartan 25 milliGRAM(s) Oral daily  sodium chloride 0.9%. 1000 milliLiter(s) IV Continuous <Continuous>      PAST MEDICAL & SURGICAL HISTORY:  Aspirin allergy  Abnormal echocardiogram  HLD (hyperlipidemia)  HTN (hypertension)  H/O nasal polypectomy  Status post bilateral hernia repair      Vitals:  T(F): 99.2 (07-24), Max: 99.2 (07-24)  HR: 69 (07-24) (68 - 79)  BP: 137/83 (07-24) (126/71 - 137/83)  RR: 16 (07-24)  SpO2: 96% (07-24)  I&O's Summary      Physical Exam:  Appearance: No acute distress; well appearing  Eyes: PERRL, EOMI, pink conjunctiva  HENT: Normal oral muscosa  Cardiovascular: RRR, S1, S2, no murmurs, rubs, or gallops; no edema; no JVD  Respiratory: Clear to auscultation bilaterally  Gastrointestinal: soft, non-tender, non-distended with normal bowel sounds  Musculoskeletal: No clubbing; no joint deformity   Neurologic: Non-focal  Lymphatic: No lymphadenopathy  Psychiatry: AAOx3, mood & affect appropriate  Skin: No rashes, ecchymoses, or cyanosis                          12.6   8.01  )-----------( 160      ( 24 Jul 2018 06:02 )             39.1     07-24    141  |  101  |  15  ----------------------------<  90  3.7   |  28  |  0.79    Ca    8.5      24 Jul 2018 06:02  Phos  2.9     07-23  Mg     2.3     07-23    TPro  7.0  /  Alb  3.9  /  TBili  0.7  /  DBili  x   /  AST  16  /  ALT  19  /  AlkPhos  68  07-23    PT/INR - ( 22 Jul 2018 15:26 )   PT: 13.6 SEC;   INR: 1.22          PTT - ( 22 Jul 2018 15:26 )  PTT:28.5 SEC      Serum Pro-Brain Natriuretic Peptide: 335.7 pg/mL (07-23 @ 06:00)      TTE:  CONCLUSIONS:  1. Mitral annular calcification, otherwise normal mitral  valve. Mild-moderate mitral regurgitation.  2. Normal left ventricular internal dimensions and wall  thicknesses.  3. Moderate global left ventricular systolic dysfunction.  Paradoxical septal wall motion, consistent with an  intraventricular conduction delay.  4. Normal right ventricular size and function.
Patient is a 58y old  Male who presents with a chief complaint of chest pain (2018 19:21)      SUBJECTIVE / OVERNIGHT EVENTS: Chest pain improved. No dysuria    MEDICATIONS  (STANDING):  atorvastatin 80 milliGRAM(s) Oral at bedtime  carvedilol 3.125 milliGRAM(s) Oral every 12 hours  cefTRIAXone   IVPB 1 Gram(s) IV Intermittent once  heparin  Injectable 5000 Unit(s) SubCutaneous every 8 hours  lactated ringers. 500 milliLiter(s) (100 mL/Hr) IV Continuous <Continuous>  losartan 25 milliGRAM(s) Oral daily  sodium chloride 0.9%. 1000 milliLiter(s) (75 mL/Hr) IV Continuous <Continuous>    MEDICATIONS  (PRN):  acetaminophen   Tablet 650 milliGRAM(s) Oral every 6 hours PRN For Temp greater than 38 C (100.4 F)  acetaminophen   Tablet. 650 milliGRAM(s) Oral every 6 hours PRN Mild and Moderate      T(C): 37.3 (18 @ 06:15), Max: 37.3 (18 @ 06:15)  HR: 69 (18 @ 06:15) (68 - 79)  BP: 137/83 (18 @ 06:15) (126/71 - 137/83)  RR: 16 (18 @ 06:15) (16 - 16)  SpO2: 96% (18 @ 06:15) (96% - 100%)  CAPILLARY BLOOD GLUCOSE        I&O's Summary      PHYSICAL EXAM:  GENERAL: NAD,   HEAD:  Normocephalic  EYES: EOMI,  conjunctiva and sclera clear  NECK: Supple,   CHEST/LUNG: Clear to auscultation bilaterally; No wheeze  HEART:  s1 s2 + Regular rate and rhythm;   ABDOMEN: Soft, Nontender, Nondistended; Bowel sounds present  EXTREMITIES:   No clubbing, cyanosis  NEURO: AAOx3      LABS:                        12.6   8.01  )-----------( 160      ( 2018 06:02 )             39.1         141  |  101  |  15  ----------------------------<  90  3.7   |  28  |  0.79    Ca    8.5      2018 06:02  Phos  2.9       Mg     2.3         TPro  7.0  /  Alb  3.9  /  TBili  0.7  /  DBili  x   /  AST  16  /  ALT  19  /  AlkPhos  68      PT/INR - ( 2018 15:26 )   PT: 13.6 SEC;   INR: 1.22          PTT - ( 2018 15:26 )  PTT:28.5 SEC      Urinalysis Basic - ( 2018 17:05 )    Color: YELLOW / Appearance: HAZY / S.026 / pH: 6.0  Gluc: NEGATIVE / Ketone: TRACE  / Bili: NEGATIVE / Urobili: NORMAL mg/dL   Blood: LARGE / Protein: 100 mg/dL / Nitrite: POSITIVE   Leuk Esterase: LARGE / RBC: 10-25 / WBC >50   Sq Epi: OCC / Non Sq Epi: x / Bacteria: MANY            Care Discussed with Consultants/Other Providers: Cardiology
Patient is a 58y old  Male who presents with a chief complaint of chest pain (2018 19:21)        SUBJECTIVE / OVERNIGHT EVENTS: Pt denies any chest pain, palpitations, dyspnea. He says he did have a fast heart beat on presentation but that is improved. He denies current dysuria, frequency, hematuria. States he got his PSA checked this past year and it was normal.      MEDICATIONS  (STANDING):  atorvastatin 80 milliGRAM(s) Oral at bedtime  cefTRIAXone   IVPB 1 Gram(s) IV Intermittent every 24 hours  heparin  Injectable 5000 Unit(s) SubCutaneous every 8 hours  lactated ringers. 500 milliLiter(s) (100 mL/Hr) IV Continuous <Continuous>  sodium chloride 0.9%. 1000 milliLiter(s) (75 mL/Hr) IV Continuous <Continuous>  valsartan 160 milliGRAM(s) Oral daily    MEDICATIONS  (PRN):  acetaminophen   Tablet 650 milliGRAM(s) Oral every 6 hours PRN For Temp greater than 38 C (100.4 F)  acetaminophen   Tablet. 650 milliGRAM(s) Oral every 6 hours PRN Mild and Moderate      Vital Signs Last 24 Hrs  T(C): 37.6 (2018 10:17), Max: 39.3 (2018 15:13)  T(F): 99.6 (2018 10:17), Max: 102.8 (2018 15:13)  HR: 80 (2018 10:17) (70 - 92)  BP: 134/747 (2018 10:17) (124/84 - 157/80)  BP(mean): --  RR: 14 (2018 10:17) (14 - 20)  SpO2: 98% (2018 10:17) (97% - 100%)  CAPILLARY BLOOD GLUCOSE        I&O's Summary        PHYSICAL EXAM  GENERAL: NAD, well-developed  HEAD:  Atraumatic, Normocephalic  EYES: EOMI, PERRLA, conjunctiva and sclera clear  NECK: Supple, No JVD  CHEST/LUNG: Clear to auscultation bilaterally; No wheeze  HEART: Regular rate and rhythm; No murmurs, rubs, or gallops  ABDOMEN: Soft, Nontender, Nondistended; Bowel sounds present  EXTREMITIES:  2+ Peripheral Pulses, No clubbing, cyanosis, or edema      LABS:                        12.1   11.00 )-----------( 195      ( 2018 06:00 )             37.6     07-23    139  |  99  |  19  ----------------------------<  85  3.6   |  25  |  0.93    Ca    8.2<L>      2018 06:00  Phos  2.9     07  Mg     2.3         TPro  7.0  /  Alb  3.9  /  TBili  0.7  /  DBili  x   /  AST  16  /  ALT  19  /  AlkPhos  68  07-23    PT/INR - ( 2018 15:26 )   PT: 13.6 SEC;   INR: 1.22          PTT - ( 2018 15:26 )  PTT:28.5 SEC      Urinalysis Basic - ( 2018 17:05 )    Color: YELLOW / Appearance: HAZY / S.026 / pH: 6.0  Gluc: NEGATIVE / Ketone: TRACE  / Bili: NEGATIVE / Urobili: NORMAL mg/dL   Blood: LARGE / Protein: 100 mg/dL / Nitrite: POSITIVE   Leuk Esterase: LARGE / RBC: 10-25 / WBC >50   Sq Epi: OCC / Non Sq Epi: x / Bacteria: MANY          RADIOLOGY & ADDITIONAL TESTS:    Imaging Personally Reviewed:  Consultant(s) Notes Reviewed:  Cardiology  Care Discussed with Consultants/Other Providers: Dr. Aguilar (cardiology)

## 2018-07-24 NOTE — PROGRESS NOTE ADULT - ASSESSMENT
58M HTN, HLD p/w sepsis most likely due to UTI, also found to have LBBB of unclear acuity.
Patient with newly depressed EF- moderate global systolic dysfunction. Explained to patient that plan for LHC once of IV abx however patient does not want to wait for further testing, requesting it be done as outpatient. Explained to patient that we recommend that patient stay for further testing and there is risk of possible MI or danger arrythmias if he leaves hospital. Patient understands and wishes to leave against medical advice.    Patient should be discharged on ASA, statin, coreg, ARB and have close cardiology follow up. Please call back with questions.
58M HTN, HLD p/w sepsis most likely due to UTI, also found to have LBBB of unclear acuity.

## 2018-07-24 NOTE — PROGRESS NOTE ADULT - PROBLEM SELECTOR PLAN 4
Nitrite positive with microscopic hematuria. Abx as above.
Nitrite positive with microscopic hematuria. Abx as above.

## 2018-07-24 NOTE — DISCHARGE NOTE ADULT - CARE PROVIDER_API CALL
Johnny Pimentel (MD), Internal Medicine  [address] [place] [address]  Phone: [phone]  Fax: [phone]

## 2018-07-24 NOTE — PROGRESS NOTE ADULT - PROBLEM SELECTOR PLAN 7
DVT ppx: Hep sub q in case cardio intervention is needed
DVT ppx: Hep sub q in case cardio intervention is needed

## 2018-07-24 NOTE — PROGRESS NOTE ADULT - PROBLEM SELECTOR PLAN 6
home meds of rosuvastatin 20mg qd   c/w therapeutic interchange w/ Lipitor 80mg qd
home meds of rosuvastatin 20mg qd   c/w therapeutic interchange w/ Lipitor 80mg qd

## 2018-07-24 NOTE — DISCHARGE NOTE ADULT - PLAN OF CARE
Recommend cardiac catheterization inpatient, however you are refusing at this time, please follow up with your primary care doctor/ cardiology Prevent complications You completed your course of antibiotics in the hospital. Continue aspirin, losartan and coreg. Recommend cardiac catheterization inpatient, however you are refusing at this time, please follow up with your primary care doctor/ cardiology

## 2018-07-25 LAB
-  AMIKACIN: SIGNIFICANT CHANGE UP
-  AMPICILLIN/SULBACTAM: SIGNIFICANT CHANGE UP
-  AMPICILLIN: SIGNIFICANT CHANGE UP
-  AZTREONAM: SIGNIFICANT CHANGE UP
-  CEFAZOLIN: SIGNIFICANT CHANGE UP
-  CEFEPIME: SIGNIFICANT CHANGE UP
-  CEFOXITIN: SIGNIFICANT CHANGE UP
-  CEFTAZIDIME: SIGNIFICANT CHANGE UP
-  CEFTRIAXONE: SIGNIFICANT CHANGE UP
-  CIPROFLOXACIN: SIGNIFICANT CHANGE UP
-  ERTAPENEM: SIGNIFICANT CHANGE UP
-  GENTAMICIN: SIGNIFICANT CHANGE UP
-  IMIPENEM: SIGNIFICANT CHANGE UP
-  LEVOFLOXACIN: SIGNIFICANT CHANGE UP
-  MEROPENEM: SIGNIFICANT CHANGE UP
-  NITROFURANTOIN: SIGNIFICANT CHANGE UP
-  PIPERACILLIN/TAZOBACTAM: SIGNIFICANT CHANGE UP
-  TIGECYCLINE: SIGNIFICANT CHANGE UP
-  TOBRAMYCIN: SIGNIFICANT CHANGE UP
-  TRIMETHOPRIM/SULFAMETHOXAZOLE: SIGNIFICANT CHANGE UP
BACTERIA UR CULT: SIGNIFICANT CHANGE UP
METHOD TYPE: SIGNIFICANT CHANGE UP
ORGANISM # SPEC MICROSCOPIC CNT: SIGNIFICANT CHANGE UP
ORGANISM # SPEC MICROSCOPIC CNT: SIGNIFICANT CHANGE UP

## 2018-07-27 LAB
BACTERIA BLD CULT: SIGNIFICANT CHANGE UP
BACTERIA BLD CULT: SIGNIFICANT CHANGE UP

## 2020-07-28 NOTE — PATIENT PROFILE ADULT. - NS PRO PT RIGHT SUPPORT PERSON
A catheter was exchanged for a (Catheter 6fr Jl3.5 Crv 100cm Lg Inner Lum Radopq) catheter. Declines

## 2023-05-11 NOTE — H&P ADULT - FAMILY HISTORY
No pertinent family history in first degree relatives Mother  Still living? Unknown  Family history of hypertension, Age at diagnosis: Age Unknown Mohs Histo Method Verbiage: Each section was then chromacoded and processed in the Mohs lab using the Mohs protocol and submitted for frozen section.